# Patient Record
Sex: MALE | Race: ASIAN | URBAN - METROPOLITAN AREA
[De-identification: names, ages, dates, MRNs, and addresses within clinical notes are randomized per-mention and may not be internally consistent; named-entity substitution may affect disease eponyms.]

---

## 2021-09-30 ENCOUNTER — OFFICE VISIT (OUTPATIENT)
Dept: URBAN - METROPOLITAN AREA CLINIC 80 | Facility: CLINIC | Age: 26
End: 2021-09-30

## 2021-10-26 ENCOUNTER — WEB ENCOUNTER (OUTPATIENT)
Dept: URBAN - METROPOLITAN AREA CLINIC 80 | Facility: CLINIC | Age: 26
End: 2021-10-26

## 2021-10-26 ENCOUNTER — DASHBOARD ENCOUNTERS (OUTPATIENT)
Age: 26
End: 2021-10-26

## 2021-10-26 ENCOUNTER — OFFICE VISIT (OUTPATIENT)
Dept: URBAN - METROPOLITAN AREA CLINIC 80 | Facility: CLINIC | Age: 26
End: 2021-10-26
Payer: COMMERCIAL

## 2021-10-26 DIAGNOSIS — R10.9 LEFT SIDED ABDOMINAL PAIN: ICD-10-CM

## 2021-10-26 PROCEDURE — 99203 OFFICE O/P NEW LOW 30 MIN: CPT | Performed by: PHYSICIAN ASSISTANT

## 2021-10-26 PROCEDURE — 74177 CT ABD & PELVIS W/CONTRAST: CPT | Performed by: PHYSICIAN ASSISTANT

## 2021-10-26 RX ORDER — DICYCLOMINE HYDROCHLORIDE 10 MG/1
2 CAPSULES CAPSULE ORAL
Qty: 30 | Refills: 3 | OUTPATIENT
Start: 2021-10-26 | End: 2022-02-22

## 2021-10-26 NOTE — HPI-TODAY'S VISIT:
Patient presents c/o LLQ discomfort starting about 3 months ago Pain comes and goes Nothing seems to make it worse or better Normal bowel habit is 1-2 BM a day - no change No BRBPR or melena No fevers or chills No nausea or emesis No family hx colon ca, polyps or IBD No unintentional weight loss Has abd xray at [facility] that was normal per the patient 1 mo ago

## 2021-10-26 NOTE — PHYSICAL EXAM GASTROINTESTINAL
Abdomen, soft, mildly tender LLQ, nondistended, no guarding or rigidity, no masses palpable, normal bowel sounds, Liver and Spleen, no hepatomegaly present, no hepatosplenomegaly, liver nontenderRectal, deferred